# Patient Record
Sex: MALE | URBAN - METROPOLITAN AREA
[De-identification: names, ages, dates, MRNs, and addresses within clinical notes are randomized per-mention and may not be internally consistent; named-entity substitution may affect disease eponyms.]

---

## 2022-10-06 ENCOUNTER — NURSING HOME VISIT (OUTPATIENT)
Dept: PULMONOLOGY | Facility: HOSPITAL | Age: 57
End: 2022-10-06

## 2022-10-06 DIAGNOSIS — J41.1 MUCOPURULENT CHRONIC BRONCHITIS (HCC): Primary | ICD-10-CM

## 2022-10-06 DIAGNOSIS — Z43.0 TRACHEOSTOMY CARE (HCC): ICD-10-CM

## 2022-10-06 PROBLEM — J42 CHRONIC BRONCHITIS (HCC): Status: ACTIVE | Noted: 2022-10-06

## 2022-10-06 NOTE — ASSESSMENT & PLAN NOTE
Feels exhibiting symptoms of chronic bronchitis possible COPD at this point I would add Jebbs t i d  for the next 48 hours and then as needed for the remainder of his stay  It would be in his best interest to follow up with Pulmonary as an outpatient for trach changing and probable COPD follow-up

## 2022-10-06 NOTE — ASSESSMENT & PLAN NOTE
Wilber Keith has chronic trach with Dulce Box valve gets changed approximately every 3-4 months  It has been about 6 weeks since his last trach change no urgent need for trach change at this time  Continue normal trach care

## 2022-10-06 NOTE — PROGRESS NOTES
Assessment/Plan:    Tracheostomy care [facility])  Jacob Tabares has chronic trach with Passy Ruben valve gets changed approximately every 3-4 months  It has been about 6 weeks since his last trach change no urgent need for trach change at this time  Continue normal trach care  Chronic bronchitis (HCC)  Feels exhibiting symptoms of chronic bronchitis possible COPD at this point I would add DuoNebs t i d  for the next 48 hours and then as needed for the remainder of his stay  It would be in his best interest to follow up with Pulmonary as an outpatient for trach changing and probable COPD follow-up  There are no diagnoses linked to this encounter  Subjective:      Patient ID: Ara Gallego is a 61 y o  male  Jacob Tabares admits to some cough and wheeze with some chest tightness over the past day or 2  He denies any other new complaints  The following portions of the patient's history were reviewed and updated as appropriate: allergies, current medications, past family history, past medical history, past social history, past surgical history and problem list     Review of Systems   Constitutional: Negative  HENT: Negative  Eyes: Negative  Respiratory: Positive for wheezing  Negative for shortness of breath  Cardiovascular: Negative  Gastrointestinal: Negative  Endocrine: Negative  Genitourinary: Negative  Allergic/Immunologic: Negative  Neurological: Negative  Hematological: Negative  Psychiatric/Behavioral: Negative  Objective: There were no vitals taken for this visit  Physical Exam  Constitutional:       Appearance: He is well-developed  HENT:      Head: Normocephalic  Eyes:      Pupils: Pupils are equal, round, and reactive to light  Cardiovascular:      Rate and Rhythm: Normal rate  Pulmonary:      Effort: Pulmonary effort is normal  No respiratory distress  Breath sounds: Wheezing and rhonchi present  No rales     Abdominal:      Palpations: Abdomen is soft  Musculoskeletal:         General: Normal range of motion  Cervical back: Neck supple  Skin:     General: Skin is warm and dry  Neurological:      Mental Status: He is alert and oriented to person, place, and time